# Patient Record
Sex: FEMALE | Race: WHITE | URBAN - METROPOLITAN AREA
[De-identification: names, ages, dates, MRNs, and addresses within clinical notes are randomized per-mention and may not be internally consistent; named-entity substitution may affect disease eponyms.]

---

## 2017-05-24 ENCOUNTER — OFFICE VISIT (OUTPATIENT)
Dept: FAMILY MEDICINE CLINIC | Facility: CLINIC | Age: 82
End: 2017-05-24

## 2017-05-24 VITALS
TEMPERATURE: 98 F | WEIGHT: 132.25 LBS | BODY MASS INDEX: 24.34 KG/M2 | DIASTOLIC BLOOD PRESSURE: 78 MMHG | RESPIRATION RATE: 16 BRPM | HEART RATE: 64 BPM | HEIGHT: 62 IN | SYSTOLIC BLOOD PRESSURE: 126 MMHG

## 2017-05-24 DIAGNOSIS — Z00.00 HEALTH CARE MAINTENANCE: Primary | ICD-10-CM

## 2017-05-24 DIAGNOSIS — M89.9 DISORDER OF BONE AND CARTILAGE: ICD-10-CM

## 2017-05-24 DIAGNOSIS — M94.9 DISORDER OF BONE AND CARTILAGE: ICD-10-CM

## 2017-05-24 DIAGNOSIS — Z00.00 ENCOUNTER FOR ANNUAL HEALTH EXAMINATION: ICD-10-CM

## 2017-05-24 DIAGNOSIS — K21.9 GASTROESOPHAGEAL REFLUX DISEASE WITHOUT ESOPHAGITIS: ICD-10-CM

## 2017-05-24 DIAGNOSIS — E78.49 FAMILIAL MULTIPLE LIPOPROTEIN-TYPE HYPERLIPIDEMIA: ICD-10-CM

## 2017-05-24 PROCEDURE — 99397 PER PM REEVAL EST PAT 65+ YR: CPT | Performed by: FAMILY MEDICINE

## 2017-05-24 PROCEDURE — G0439 PPPS, SUBSEQ VISIT: HCPCS | Performed by: FAMILY MEDICINE

## 2017-05-24 PROCEDURE — 96160 PT-FOCUSED HLTH RISK ASSMT: CPT | Performed by: FAMILY MEDICINE

## 2017-05-24 RX ORDER — MELATONIN
1 DAILY
COMMUNITY
Start: 2016-05-25 | End: 2021-09-14

## 2017-05-24 RX ORDER — CHLORAL HYDRATE 500 MG
1 CAPSULE ORAL DAILY
COMMUNITY
Start: 2004-11-01 | End: 2021-08-30

## 2017-05-24 RX ORDER — BIOTIN 1 MG
1 TABLET ORAL DAILY
COMMUNITY
Start: 2015-01-26

## 2017-05-24 RX ORDER — MECLIZINE HCL 12.5 MG/1
1 TABLET ORAL AS NEEDED
COMMUNITY
Start: 2016-01-08 | End: 2019-03-05

## 2017-05-24 RX ORDER — IBUPROFEN 200 MG
1 CAPSULE ORAL DAILY
COMMUNITY
Start: 2016-05-25

## 2017-05-24 RX ORDER — SPIRONOLACTONE 25 MG
1 TABLET ORAL
COMMUNITY
Start: 2015-01-26

## 2017-05-24 RX ORDER — VITAMIN E 268 MG
1 CAPSULE ORAL DAILY
COMMUNITY
Start: 1997-11-01

## 2017-05-24 RX ORDER — CETIRIZINE HYDROCHLORIDE 10 MG/1
1 TABLET ORAL DAILY
COMMUNITY
Start: 2015-01-26 | End: 2021-09-14

## 2017-05-24 RX ORDER — OMEPRAZOLE 20 MG/1
1 CAPSULE, DELAYED RELEASE ORAL DAILY
COMMUNITY
Start: 2015-01-26

## 2017-05-24 NOTE — PATIENT INSTRUCTIONS
Continue with same medications and healthy lifestyle. E Cathi Kc's SCREENING SCHEDULE   Tests on this list are recommended by your physician but may not be covered, or covered at this frequency, by your insurer.  Please check with your insurance carrier Sigmoidoscopy Screen  Covered every 5 years No results found for this or any previous visit. No flowsheet data found. Fecal Occult Blood   Covered Annually No results found for: FOB, OCCULTSTOOL No flowsheet data found.      Barium Enema-   uncomfortabl for Moderate/High Risk       No orders found for this or any previous visit.  Medium/high risk factors:   End-stage renal disease   Hemophiliacs who received Factor VIII or IX concentrates   Clients of institutions for the mentally retarded   Persons who li

## 2017-05-24 NOTE — PROGRESS NOTES
[address]  PROGRESS NOTE  Chief Complaint:   Patient presents with: Well Adult: Medicare physical      HPI:   This is a 80year old female coming in for general wellness and recheck of problems. Overall patient feels well.   She stays Oral Tab Take 1 tablet by mouth as needed. For  dizziness Disp:  Rfl:    omega-3 fatty acids 1000 MG Oral Cap Take 1 capsule by mouth daily. Disp:  Rfl:    vitamin E 400 UNITS Oral Cap Take 1 capsule by mouth daily.  Disp:  Rfl:    omeprazole (PRILOSEC) 20 nasal discharge Throat:  No tonsillar erythema or exudate. Mouth:  No oral lesions or ulcerations, good dentition. NECK: Supple, no CLAD, no JVD, no thyromegaly. SKIN: No rashes, no skin lesion, no bruising, good turgor.   HEART:  Regular rate and rhythm treatments as a result of today.      Problem List:  Patient Active Problem List:     Esophageal reflux     Familial multiple lipoprotein-type hyperlipidemia     Disorder of bone and cartilage     Dizziness and giddiness      Olivier Crowe MD  5/24/2017

## 2018-05-16 ENCOUNTER — OFFICE VISIT (OUTPATIENT)
Dept: FAMILY MEDICINE CLINIC | Facility: CLINIC | Age: 83
End: 2018-05-16

## 2018-05-16 VITALS
HEIGHT: 62 IN | DIASTOLIC BLOOD PRESSURE: 70 MMHG | SYSTOLIC BLOOD PRESSURE: 132 MMHG | HEART RATE: 84 BPM | BODY MASS INDEX: 24.44 KG/M2 | TEMPERATURE: 98 F | WEIGHT: 132.81 LBS | RESPIRATION RATE: 16 BRPM

## 2018-05-16 DIAGNOSIS — Z00.00 HEALTH CARE MAINTENANCE: Primary | ICD-10-CM

## 2018-05-16 DIAGNOSIS — Z00.00 ENCOUNTER FOR ANNUAL HEALTH EXAMINATION: ICD-10-CM

## 2018-05-16 DIAGNOSIS — R42 VERTIGO: ICD-10-CM

## 2018-05-16 DIAGNOSIS — E78.49 FAMILIAL MULTIPLE LIPOPROTEIN-TYPE HYPERLIPIDEMIA: ICD-10-CM

## 2018-05-16 DIAGNOSIS — M94.9 DISORDER OF BONE AND CARTILAGE: ICD-10-CM

## 2018-05-16 DIAGNOSIS — M89.9 DISORDER OF BONE AND CARTILAGE: ICD-10-CM

## 2018-05-16 DIAGNOSIS — K21.9 GASTROESOPHAGEAL REFLUX DISEASE WITHOUT ESOPHAGITIS: ICD-10-CM

## 2018-05-16 DIAGNOSIS — R73.9 HYPERGLYCEMIA: ICD-10-CM

## 2018-05-16 PROCEDURE — 99397 PER PM REEVAL EST PAT 65+ YR: CPT | Performed by: FAMILY MEDICINE

## 2018-05-16 PROCEDURE — G0439 PPPS, SUBSEQ VISIT: HCPCS | Performed by: FAMILY MEDICINE

## 2018-05-16 PROCEDURE — 96160 PT-FOCUSED HLTH RISK ASSMT: CPT | Performed by: FAMILY MEDICINE

## 2018-05-16 RX ORDER — ATORVASTATIN CALCIUM 10 MG/1
10 TABLET, FILM COATED ORAL NIGHTLY
Qty: 30 TABLET | Refills: 2 | Status: SHIPPED | OUTPATIENT
Start: 2018-05-16 | End: 2018-08-20

## 2018-05-16 NOTE — PROGRESS NOTES
[facility] [place]  PROGRESS NOTE  Chief Complaint:   Patient presents with:  Physical      HPI:   This is a [de-identified]year old female coming in for general wellness and recheck of problems of. Overall the patient feels fairly well.   She has a long hi Dash Esquivel Brother       in his 46s of heart problems     Allergies:  No Known Allergies  Current Meds:    Current Outpatient Prescriptions:  Biotin 1000 MCG Oral Tab Take 1 tablet by mouth daily.  Disp:  Rfl:    Calcium 600 MG Oral Tab Take 1 tablet by mo splenectomy. PSYCHIATRIC:  Denies depression or anxiety.       EXAM:   /70 (BP Location: Left arm, Patient Position: Sitting, Cuff Size: adult)   Pulse 84   Temp 98.4 °F (36.9 °C) (Temporal)   Resp 16   Ht 62\"   Wt 132 lb 12.8 oz   BMI 24.29 kg/m² start atorvastatin 10 mg daily  2. GERD: Recommend EGD by gastroenterologist in [facility]. 3.  Vertigo: Intermittent and chronic. Will continue using meclizine as needed. 4.  History of osteopenia: To stay active.       Health Maintenance:  DEXA Scan due

## 2018-06-08 ENCOUNTER — OFFICE VISIT (OUTPATIENT)
Dept: FAMILY MEDICINE CLINIC | Facility: CLINIC | Age: 83
End: 2018-06-08

## 2018-06-08 VITALS
HEIGHT: 62 IN | RESPIRATION RATE: 18 BRPM | TEMPERATURE: 98 F | SYSTOLIC BLOOD PRESSURE: 118 MMHG | DIASTOLIC BLOOD PRESSURE: 64 MMHG | BODY MASS INDEX: 24.36 KG/M2 | WEIGHT: 132.38 LBS | HEART RATE: 84 BPM

## 2018-06-08 DIAGNOSIS — L03.032 PARONYCHIA OF GREAT TOE, LEFT: Primary | ICD-10-CM

## 2018-06-08 PROCEDURE — 99214 OFFICE O/P EST MOD 30 MIN: CPT | Performed by: FAMILY MEDICINE

## 2018-06-08 RX ORDER — CEPHALEXIN 500 MG/1
500 CAPSULE ORAL 3 TIMES DAILY
Qty: 30 CAPSULE | Refills: 0 | Status: SHIPPED | OUTPATIENT
Start: 2018-06-08 | End: 2019-03-05

## 2018-06-08 NOTE — PATIENT INSTRUCTIONS
Start oral cephalexin for 10 days. Also discussed possible nail fungus infection. Also trial of over the counter lamisil. Return to clinic if no improvement or pain getting worse.

## 2018-06-08 NOTE — PROGRESS NOTES
[address]  PROGRESS NOTE  Chief Complaint:   Patient presents with:   Follow - Up: toe pain has not improved      HPI:   This is a [de-identified]year old female presents complaining of forearm left great toe pain that has been present for past coup Rfl:    cetirizine (ZYRTEC ALLERGY) 10 MG Oral Tab Take 1 tablet by mouth daily. Disp:  Rfl:    Vitamin D3 1000 units Oral Tab Take 1 tablet by mouth daily. Disp:  Rfl:    Lutein ([employer] LUTEIN) 6 MG Oral Cap Take 1 capsule by mouth daily.  Disp:  Rfl:    Mec Regular rate and rhythm, no murmurs, rubs or gallops. EXTREMITIES:  No edema, no cyanosis, no clubbing, FROM, 2+ dorsalis pedis pulses bilaterally.   ABDOMEN:  Soft, nondistended, nontender, bowel sounds normal in all 4 quadrants, no masses, no hepatosplen

## 2018-07-06 RX ORDER — CEPHALEXIN 500 MG/1
CAPSULE ORAL
Qty: 30 CAPSULE | Refills: 0 | OUTPATIENT
Start: 2018-07-06

## 2018-07-06 NOTE — TELEPHONE ENCOUNTER
Patient states Her toe is still a little red. Feels if she had one more round of antibiotic Her toe might get better. Advised appt since it has been a month since last seen. Patient declined.   States She has been seen by Dr Veda Angelucci and Dr Chepe Mccarthy  And

## 2018-07-18 RX ORDER — CEPHALEXIN 500 MG/1
500 CAPSULE ORAL 3 TIMES DAILY
Qty: 30 CAPSULE | Refills: 0 | OUTPATIENT
Start: 2018-07-18

## 2018-07-18 NOTE — TELEPHONE ENCOUNTER
Future appt:    Last Appointment:  5/16/2018  No results found for: CHOLEST, HDL, LDL, TRIGLY, TRIG  No results found for: EAG, A1C  No results found for: T4F, TSH, TSHT4    No Follow-up on file.

## 2018-08-13 ENCOUNTER — TELEPHONE (OUTPATIENT)
Dept: FAMILY MEDICINE CLINIC | Facility: CLINIC | Age: 83
End: 2018-08-13

## 2018-08-13 NOTE — TELEPHONE ENCOUNTER
Pt is due for blood work and would like to have them done at [facility]. Faxed orders to [facility]. Pt will schedule appt for blood work.

## 2018-08-20 RX ORDER — ATORVASTATIN CALCIUM 10 MG/1
10 TABLET, FILM COATED ORAL NIGHTLY
Qty: 90 TABLET | Refills: 1 | Status: SHIPPED | OUTPATIENT
Start: 2018-08-20 | End: 2019-02-12

## 2018-08-20 NOTE — TELEPHONE ENCOUNTER
Informed pt of her blood work results. Pt expressed understanding and thanks. Pt needs refill for Lipitor 90 day supply.

## 2018-08-20 NOTE — TELEPHONE ENCOUNTER
Labs done at [facility] are excellent. CBC, thyroid, chemistry profile and cholesterol are normal.  Patient was started on atorvastatin in May 2018.   Her cholesterol is improved to 140 with an LDL of 59 and an HDL of 67–these are excellent readin

## 2018-09-27 ENCOUNTER — TELEPHONE (OUTPATIENT)
Dept: FAMILY MEDICINE CLINIC | Facility: CLINIC | Age: 83
End: 2018-09-27

## 2018-09-27 NOTE — TELEPHONE ENCOUNTER
Patient is requesting lab results mailed to her home for blood test done at [facility] ordered by Dr Bassam Corral from August 18th

## 2018-12-12 ENCOUNTER — TELEPHONE (OUTPATIENT)
Dept: FAMILY MEDICINE CLINIC | Facility: CLINIC | Age: 83
End: 2018-12-12

## 2018-12-12 NOTE — TELEPHONE ENCOUNTER
Pt just wanted to know if there was a new shingle shot. I informed pt yes there is and its shingrix. Pt will go to pharmacy to get.

## 2018-12-14 ENCOUNTER — TELEPHONE (OUTPATIENT)
Dept: FAMILY MEDICINE CLINIC | Facility: CLINIC | Age: 83
End: 2018-12-14

## 2018-12-14 NOTE — TELEPHONE ENCOUNTER
Requesting records for patient, recent lab or imaging faxed to [phone] attention to [facility]

## 2019-02-12 RX ORDER — ATORVASTATIN CALCIUM 10 MG/1
10 TABLET, FILM COATED ORAL NIGHTLY
Qty: 90 TABLET | Refills: 0 | Status: SHIPPED | OUTPATIENT
Start: 2019-02-12 | End: 2019-05-09

## 2019-02-12 NOTE — TELEPHONE ENCOUNTER
Future appt:    Last Appointment:  5/16/2018 Dr Benjamin Baxter  No results found for: CHOLEST, HDL, LDL, TRIGLY, TRIG  No results found for: EAG, A1C  No results found for: T4F, TSH, TSHT4    No Follow-up on file.

## 2019-02-12 NOTE — TELEPHONE ENCOUNTER
Patient informed need for Office Visit for continuation of Medication. Appt given. Requesting 90 day Rx to [facility]. Future appt:     Your appointments     Date & Time Appointment Department [facility])    Mar 05, 2019 10:00 AM BONNY W[facility] Supervisit with Gama Michel

## 2019-03-05 ENCOUNTER — APPOINTMENT (OUTPATIENT)
Dept: LAB | Age: 84
End: 2019-03-05
Attending: NURSE PRACTITIONER
Payer: MEDICARE

## 2019-03-05 ENCOUNTER — OFFICE VISIT (OUTPATIENT)
Dept: FAMILY MEDICINE CLINIC | Facility: CLINIC | Age: 84
End: 2019-03-05
Payer: COMMERCIAL

## 2019-03-05 VITALS
HEART RATE: 80 BPM | HEIGHT: 62 IN | SYSTOLIC BLOOD PRESSURE: 130 MMHG | WEIGHT: 132.19 LBS | RESPIRATION RATE: 16 BRPM | OXYGEN SATURATION: 98 % | TEMPERATURE: 98 F | BODY MASS INDEX: 24.33 KG/M2 | DIASTOLIC BLOOD PRESSURE: 80 MMHG

## 2019-03-05 DIAGNOSIS — M89.9 DISORDER OF BONE AND CARTILAGE: ICD-10-CM

## 2019-03-05 DIAGNOSIS — E78.49 FAMILIAL MULTIPLE LIPOPROTEIN-TYPE HYPERLIPIDEMIA: ICD-10-CM

## 2019-03-05 DIAGNOSIS — R42 VERTIGO: Primary | ICD-10-CM

## 2019-03-05 DIAGNOSIS — M94.9 DISORDER OF BONE AND CARTILAGE: ICD-10-CM

## 2019-03-05 DIAGNOSIS — Z51.81 MEDICATION MONITORING ENCOUNTER: ICD-10-CM

## 2019-03-05 DIAGNOSIS — Z00.00 ENCOUNTER FOR ANNUAL HEALTH EXAMINATION: ICD-10-CM

## 2019-03-05 LAB
ALBUMIN SERPL-MCNC: 4.1 G/DL (ref 3.4–5)
ALBUMIN/GLOB SERPL: 1.2 {RATIO} (ref 1–2)
ALP LIVER SERPL-CCNC: 101 U/L (ref 55–142)
ALT SERPL-CCNC: 31 U/L (ref 13–56)
ANION GAP SERPL CALC-SCNC: 7 MMOL/L (ref 0–18)
AST SERPL-CCNC: 25 U/L (ref 15–37)
BILIRUB SERPL-MCNC: 0.8 MG/DL (ref 0.1–2)
BUN BLD-MCNC: 16 MG/DL (ref 7–18)
BUN/CREAT SERPL: 20 (ref 10–20)
CALCIUM BLD-MCNC: 10.1 MG/DL (ref 8.5–10.1)
CHLORIDE SERPL-SCNC: 107 MMOL/L (ref 98–107)
CO2 SERPL-SCNC: 26 MMOL/L (ref 21–32)
CREAT BLD-MCNC: 0.8 MG/DL (ref 0.55–1.02)
GLOBULIN PLAS-MCNC: 3.5 G/DL (ref 2.8–4.4)
GLUCOSE BLD-MCNC: 104 MG/DL (ref 70–99)
M PROTEIN MFR SERPL ELPH: 7.6 G/DL (ref 6.4–8.2)
OSMOLALITY SERPL CALC.SUM OF ELEC: 291 MOSM/KG (ref 275–295)
POTASSIUM SERPL-SCNC: 4.1 MMOL/L (ref 3.5–5.1)
SODIUM SERPL-SCNC: 140 MMOL/L (ref 136–145)

## 2019-03-05 PROCEDURE — 90471 IMMUNIZATION ADMIN: CPT | Performed by: NURSE PRACTITIONER

## 2019-03-05 PROCEDURE — 93000 ELECTROCARDIOGRAM COMPLETE: CPT | Performed by: NURSE PRACTITIONER

## 2019-03-05 PROCEDURE — 90715 TDAP VACCINE 7 YRS/> IM: CPT | Performed by: NURSE PRACTITIONER

## 2019-03-05 PROCEDURE — 96160 PT-FOCUSED HLTH RISK ASSMT: CPT | Performed by: NURSE PRACTITIONER

## 2019-03-05 PROCEDURE — G0439 PPPS, SUBSEQ VISIT: HCPCS | Performed by: NURSE PRACTITIONER

## 2019-03-05 PROCEDURE — 80053 COMPREHEN METABOLIC PANEL: CPT | Performed by: NURSE PRACTITIONER

## 2019-03-05 PROCEDURE — 99397 PER PM REEVAL EST PAT 65+ YR: CPT | Performed by: NURSE PRACTITIONER

## 2019-03-05 PROCEDURE — 36415 COLL VENOUS BLD VENIPUNCTURE: CPT | Performed by: NURSE PRACTITIONER

## 2019-03-05 RX ORDER — MULTIVITAMIN WITH IRON
250 TABLET ORAL DAILY
COMMUNITY
End: 2021-09-14

## 2019-03-05 RX ORDER — MECLIZINE HCL 12.5 MG/1
12.5 TABLET ORAL AS NEEDED
Qty: 90 TABLET | Refills: 0 | Status: SHIPPED | OUTPATIENT
Start: 2019-03-05 | End: 2021-09-14

## 2019-03-05 NOTE — PROGRESS NOTES
HPI:   BORA Cathi Easton is a 82year old female who presents for a MA (Medicare Advantage) Supervisit (Once per calendar year). Patient is present for her annual wellness exam.  States in general that she is doing well.   She is current on her mammogram as Patient Care Team:  Latia Hurst MD as PCP - General    Patient Active Problem List:     Esophageal reflux     Familial multiple lipoprotein-type hyperlipidemia     Disorder of bone and cartilage     Vertigo    Wt Readings from Last 3 Encounters:  03/05/ (2006); tubal ligation; and cataract. Her family history includes CAD in her brother, father, and mother; Cervical cancer in her sister; Lung cancer in her brother; Ovarian Cancer in her sister. SOCIAL HISTORY:   She  reports that  has never smoked.  s palpation. Breasts:  normal appearance, no masses or tenderness, Inspection negative, No nipple retraction or dimpling, No axillary or supraclavicular adenopathy   Extremities: Good range of motion. No edema. Skin: Intact, dry.   Several moles noted viki mental well-being?: Social Interaction;Games; Visiting Family;Puzzles; Visiting Friends      This section provided for quick review of chart, separate sheet to patient  [address] Internal Lab or Procedure External Lab or (Pneumovax)  Covered Once after 65 01/01/2004 Please get once after your 65th birthday    Hepatitis B for Moderate/High Risk No vaccine history found Medium/high risk factors:   End-stage renal disease   Hemophiliacs who received Factor VIII or IX concentr

## 2019-03-05 NOTE — PATIENT INSTRUCTIONS
Refill done for meclizine. EKG: sinus rhythm with occasional PAC, one PVC, HR 77  CMP pending. Continue with healthy lifestyle. Follow-up in August for appointment and labs. Sooner if needed.    E Cathi Kc's SCREENING SCHEDULE   Tests on this list Colonoscopy Screen   Covered every 10 years- more often if abnormal Colonoscopy due on 03/26/2020 Update [facility] if applicable    Flex Sigmoidoscopy Screen  Covered every 5 years No results found for this or any previous visit.  No flowsheet data after your 65th birthday    Pneumococcal 23 (Pneumovax)  Covered Once after 65 No orders found for this or any previous visit.  Please get once after your 65th birthday    Hepatitis B for Moderate/High Risk       No orders found for this or any previous vis

## 2019-03-06 ENCOUNTER — TELEPHONE (OUTPATIENT)
Dept: FAMILY MEDICINE CLINIC | Facility: CLINIC | Age: 84
End: 2019-03-06

## 2019-03-06 NOTE — TELEPHONE ENCOUNTER
----- Message from MONA Vaughn sent at 3/6/2019  8:21 AM CST -----  Blood sugar is slightly elevated, but okay since was nonfasting. Please let patient know that her liver enzymes are normal.  Thank you.

## 2019-03-07 ENCOUNTER — HOSPITAL ENCOUNTER (OUTPATIENT)
Dept: BONE DENSITY | Age: 84
Discharge: HOME | End: 2019-03-07
Attending: NURSE PRACTITIONER
Payer: MEDICARE

## 2019-03-07 DIAGNOSIS — M89.9 DISORDER OF BONE AND CARTILAGE: ICD-10-CM

## 2019-03-07 DIAGNOSIS — Z00.00 ENCOUNTER FOR ANNUAL HEALTH EXAMINATION: ICD-10-CM

## 2019-03-07 DIAGNOSIS — M94.9 DISORDER OF BONE AND CARTILAGE: ICD-10-CM

## 2019-03-07 PROCEDURE — 77080 DXA BONE DENSITY AXIAL: CPT | Performed by: NURSE PRACTITIONER

## 2019-03-08 ENCOUNTER — TELEPHONE (OUTPATIENT)
Dept: FAMILY MEDICINE CLINIC | Facility: CLINIC | Age: 84
End: 2019-03-08

## 2019-03-08 NOTE — TELEPHONE ENCOUNTER
Informed pt of her dexa scan results. Pt refused Prolia, sister in law had a back reaction to this med. Pt will continue to take vit D and calcium. Pt expressed understanding and thanks.

## 2019-05-09 RX ORDER — ATORVASTATIN CALCIUM 10 MG/1
TABLET, FILM COATED ORAL
Qty: 90 TABLET | Refills: 0 | Status: SHIPPED | OUTPATIENT
Start: 2019-05-09 | End: 2019-08-08

## 2019-08-08 DIAGNOSIS — E78.49 FAMILIAL MULTIPLE LIPOPROTEIN-TYPE HYPERLIPIDEMIA: Primary | ICD-10-CM

## 2019-08-08 RX ORDER — ATORVASTATIN CALCIUM 10 MG/1
TABLET, FILM COATED ORAL
Qty: 90 TABLET | Refills: 0 | Status: SHIPPED | OUTPATIENT
Start: 2019-08-08 | End: 2019-10-31

## 2019-10-31 ENCOUNTER — HOSPITAL ENCOUNTER (OUTPATIENT)
Dept: GENERAL RADIOLOGY | Age: 84
Discharge: HOME | End: 2019-10-31
Attending: FAMILY MEDICINE
Payer: MEDICARE

## 2019-10-31 ENCOUNTER — OFFICE VISIT (OUTPATIENT)
Dept: FAMILY MEDICINE CLINIC | Facility: CLINIC | Age: 84
End: 2019-10-31
Payer: COMMERCIAL

## 2019-10-31 VITALS
TEMPERATURE: 98 F | BODY MASS INDEX: 23 KG/M2 | HEIGHT: 62 IN | OXYGEN SATURATION: 95 % | RESPIRATION RATE: 18 BRPM | SYSTOLIC BLOOD PRESSURE: 120 MMHG | WEIGHT: 125 LBS | DIASTOLIC BLOOD PRESSURE: 80 MMHG | HEART RATE: 92 BPM

## 2019-10-31 DIAGNOSIS — Z01.818 PREOPERATIVE EXAMINATION: ICD-10-CM

## 2019-10-31 DIAGNOSIS — K80.20 SYMPTOMATIC CHOLELITHIASIS: ICD-10-CM

## 2019-10-31 DIAGNOSIS — K83.8 COMMON BILE DUCT DILATATION: ICD-10-CM

## 2019-10-31 DIAGNOSIS — E78.49 FAMILIAL MULTIPLE LIPOPROTEIN-TYPE HYPERLIPIDEMIA: ICD-10-CM

## 2019-10-31 DIAGNOSIS — Z01.818 PREOPERATIVE EXAMINATION: Primary | ICD-10-CM

## 2019-10-31 PROCEDURE — 93000 ELECTROCARDIOGRAM COMPLETE: CPT | Performed by: FAMILY MEDICINE

## 2019-10-31 PROCEDURE — 99214 OFFICE O/P EST MOD 30 MIN: CPT | Performed by: FAMILY MEDICINE

## 2019-10-31 PROCEDURE — 71046 X-RAY EXAM CHEST 2 VIEWS: CPT | Performed by: FAMILY MEDICINE

## 2019-10-31 RX ORDER — ATORVASTATIN CALCIUM 10 MG/1
TABLET, FILM COATED ORAL
Qty: 90 TABLET | Refills: 1 | Status: SHIPPED | OUTPATIENT
Start: 2019-10-31 | End: 2020-04-17

## 2019-10-31 RX ORDER — BETAMETHASONE DIPROPIONATE 0.5 MG/G
LOTION TOPICAL
Refills: 1 | COMMUNITY
Start: 2019-10-07 | End: 2021-09-14

## 2019-10-31 NOTE — PROGRESS NOTES
[facility]  [facility]    Chief Complaint:   Patient presents with:  Pre-Op Exam      HPI:   Alvaro Raines is a 82year old female with a hx of symptomatic gallstones, common bile duct dilation, who presents for a pre-operative physical mg by mouth daily. , Disp: , Rfl:   Cranberry 300 MG Oral Tab, Take 300 mg by mouth daily. , Disp: , Rfl:   Meclizine HCl 12.5 MG Oral Tab, Take 1 tablet (12.5 mg total) by mouth as needed.  For  dizziness, Disp: 90 tablet, Rfl: 0  Biotin 1000 MCG Oral Tab, T enlarged nodes or history of splenectomy. PSYCHIATRIC:  Denies depression or anxiety. ENDOCRINOLOGIC:  Denies excessive sweating, cold or heat intolerance, polyuria or polydipsia.   ALLERGIES:  Denies allergic response, history of asthma, sneezing, hives, depressed mood or anxiety      ASSESSMENT AND PLAN:   E June was seen today for pre-op exam.    Diagnoses and all orders for this visit:    Preoperative examination  -     ELECTROCARDIOGRAM, COMPLETE  -     CBC WITH DIFFERENTIAL WITH PLATELET  -     COMP M

## 2019-10-31 NOTE — PATIENT INSTRUCTIONS
EKG shows sinus rhythm. Chest xray and labs today. After today's assessment  patient is at optimum health for surgery and relatively at low risk. There are no contraindication for procedure.    Recommend to avoid aleve, aspirin, ibuprofen and multivitam

## 2019-11-01 ENCOUNTER — TELEPHONE (OUTPATIENT)
Dept: FAMILY MEDICINE CLINIC | Facility: CLINIC | Age: 84
End: 2019-11-01

## 2019-11-01 NOTE — TELEPHONE ENCOUNTER
----- Message from Ivy Aiken MD sent at 11/1/2019  8:28 AM CDT -----  Please inform patient that her CBC is okay, her CMP is okay except slightly elevated calcium level at 10.6. Recommend to cut back on calcium supplement if she is taking any.   Her ch

## 2019-11-02 NOTE — TELEPHONE ENCOUNTER
Let pt know the following below. Pt verbalized her understanding and had no other questions at this time. Faxed to surgeons office.

## 2019-11-02 NOTE — TELEPHONE ENCOUNTER
----- Message from Neil Oneal sent at 11/2/2019  9:56 AM CDT -----  Regarding: r/c  Can be reached back at [phone].  Thank you

## 2019-11-21 ENCOUNTER — TELEPHONE (OUTPATIENT)
Dept: FAMILY MEDICINE CLINIC | Facility: CLINIC | Age: 84
End: 2019-11-21

## 2019-11-21 DIAGNOSIS — Z90.49 S/P CHOLECYSTECTOMY: Primary | ICD-10-CM

## 2020-01-16 ENCOUNTER — PATIENT OUTREACH (OUTPATIENT)
Dept: FAMILY MEDICINE CLINIC | Facility: CLINIC | Age: 85
End: 2020-01-16

## 2020-04-17 DIAGNOSIS — E78.49 FAMILIAL MULTIPLE LIPOPROTEIN-TYPE HYPERLIPIDEMIA: ICD-10-CM

## 2020-04-17 RX ORDER — ATORVASTATIN CALCIUM 10 MG/1
TABLET, FILM COATED ORAL
Qty: 90 TABLET | Refills: 0 | Status: SHIPPED | OUTPATIENT
Start: 2020-04-17 | End: 2020-08-19

## 2020-04-17 NOTE — TELEPHONE ENCOUNTER
Future appt:    Last Appointment with provider:   10/31/2019  Last appointment at [facility]:  10/31/2019  No results found for: CHOLEST, HDL, LDL, TRIGLY, TRIG  No results found for: EAG, A1C  No results found for: T4F, TSH, TSHT4    No follow-ups on fi

## 2020-08-12 DIAGNOSIS — E78.49 FAMILIAL MULTIPLE LIPOPROTEIN-TYPE HYPERLIPIDEMIA: ICD-10-CM

## 2020-08-12 RX ORDER — ATORVASTATIN CALCIUM 10 MG/1
TABLET, FILM COATED ORAL
Qty: 90 TABLET | Refills: 0 | OUTPATIENT
Start: 2020-08-12

## 2020-08-12 NOTE — TELEPHONE ENCOUNTER
Future appt:    Last Appointment with provider:   Visit date not found  Last appointment at [facility]:  Visit date not found  No results found for: CHOLEST, HDL, LDL, TRIGLY, TRIG  No results found for: EAG, A1C  No results found for: T4F, TSH, TSHT4

## 2020-08-18 DIAGNOSIS — E78.49 FAMILIAL MULTIPLE LIPOPROTEIN-TYPE HYPERLIPIDEMIA: ICD-10-CM

## 2020-08-18 NOTE — TELEPHONE ENCOUNTER
Future appt:     Your appointments     Date & Time Appointment Department [facility])    Aug 26, 2020  2:00 PM CDT Virtual Phone Visit with Bayron Bradford MD [address] [facility] [place] ([facility])    You have been schedule

## 2020-08-18 NOTE — TELEPHONE ENCOUNTER
Future appt:    Last Appointment with provider:   Visit date not found  Last appointment at [facility]:  Visit date not found  No results found for: CHOLEST, HDL, LDL, TRIGLY, TRIG  No results found for: EAG, A1C  No results found for: T4F, TSH, TSHT4

## 2020-08-19 RX ORDER — ATORVASTATIN CALCIUM 10 MG/1
TABLET, FILM COATED ORAL
Qty: 90 TABLET | Refills: 0 | Status: SHIPPED | OUTPATIENT
Start: 2020-08-19 | End: 2020-11-13

## 2020-08-19 NOTE — TELEPHONE ENCOUNTER
See refill request from 8/12/2020.     Future Appointments   Date Time Provider Celio Bishop   8/26/2020  2:00 PM Nusrat Jean MD [facility]     Talked to nurse 8/18/2020 regarding this refill and was told the prescription would be mechelle

## 2020-08-19 NOTE — TELEPHONE ENCOUNTER
Future Appointments   Date Time Provider Celio Edna   8/26/2020  2:00 PM Grace Jean MD [facility] [place] [facility]     Talked to nurse 8/18/2020 regarding this refill and was told the prescription would be called in right away.    Patient only as a

## 2020-08-26 ENCOUNTER — VIRTUAL PHONE E/M (OUTPATIENT)
Dept: FAMILY MEDICINE CLINIC | Facility: CLINIC | Age: 85
End: 2020-08-26
Payer: COMMERCIAL

## 2020-08-26 VITALS — BODY MASS INDEX: 23 KG/M2 | WEIGHT: 125 LBS | HEIGHT: 62 IN

## 2020-08-26 DIAGNOSIS — K21.9 GASTROESOPHAGEAL REFLUX DISEASE WITHOUT ESOPHAGITIS: ICD-10-CM

## 2020-08-26 DIAGNOSIS — E78.49 FAMILIAL MULTIPLE LIPOPROTEIN-TYPE HYPERLIPIDEMIA: Primary | ICD-10-CM

## 2020-08-26 PROCEDURE — 99441 PHONE E/M BY PHYS 5-10 MIN: CPT | Performed by: FAMILY MEDICINE

## 2020-08-26 PROCEDURE — 3008F BODY MASS INDEX DOCD: CPT | Performed by: FAMILY MEDICINE

## 2020-08-26 NOTE — PROGRESS NOTES
Alvaro Yanna  verbally consents to a Virtual/Telephone Check-In service on 8/26/2020. Patient understands and accepts financial responsibility for any deductible, co-insurance and/or co-pays associated with this service.     Duration of the service: 8 m distress  HEART:  No heart racing or skipped beat. ABDOMEN: Soft,  no pain with palpation per patient. SKIN: No rashes, no skin lesion, no bruising   MUSCULOSKELETAL: Normal ROM, no joint pain, or muscle weakness in all extremity.    NEUROLOGICAL:  No w

## 2020-08-26 NOTE — PATIENT INSTRUCTIONS
Continue with low-cholesterol diet and atorvastatin. Acid reflux stable with medication. Return to clinic if any concern.

## 2020-11-11 DIAGNOSIS — E78.49 FAMILIAL MULTIPLE LIPOPROTEIN-TYPE HYPERLIPIDEMIA: ICD-10-CM

## 2020-11-13 RX ORDER — ATORVASTATIN CALCIUM 10 MG/1
TABLET, FILM COATED ORAL
Qty: 90 TABLET | Refills: 0 | Status: SHIPPED | OUTPATIENT
Start: 2020-11-13 | End: 2021-02-08

## 2020-11-13 NOTE — TELEPHONE ENCOUNTER
Future appt:     Last Appointment with provider:  8/26/2020 for a virtual visit with Dr. Gregoria Velarde; Return in about 6 months (around 2/26/2021) for medicare physical.    Last appointment at [facility]:  Visit date not found  No results found for: Kesha Manuel

## 2020-11-17 ENCOUNTER — TELEPHONE (OUTPATIENT)
Dept: FAMILY MEDICINE CLINIC | Facility: CLINIC | Age: 85
End: 2020-11-17

## 2020-11-17 NOTE — TELEPHONE ENCOUNTER
Pt was questioning why she only receives 90 days supply at a time. Pt informed that this is typically how Dr. Myra Casiano handles his refills.

## 2020-11-17 NOTE — TELEPHONE ENCOUNTER
I spoke with patient to get her scheduled for her Medicare Wellness Exam - Patient refused at this time.

## 2021-02-03 ENCOUNTER — PATIENT OUTREACH (OUTPATIENT)
Dept: FAMILY MEDICINE CLINIC | Facility: CLINIC | Age: 86
End: 2021-02-03

## 2021-02-08 ENCOUNTER — TELEPHONE (OUTPATIENT)
Dept: FAMILY MEDICINE CLINIC | Facility: CLINIC | Age: 86
End: 2021-02-08

## 2021-02-08 DIAGNOSIS — E78.49 FAMILIAL MULTIPLE LIPOPROTEIN-TYPE HYPERLIPIDEMIA: ICD-10-CM

## 2021-02-08 RX ORDER — ATORVASTATIN CALCIUM 10 MG/1
10 TABLET, FILM COATED ORAL NIGHTLY
Qty: 90 TABLET | Refills: 0 | Status: SHIPPED | OUTPATIENT
Start: 2021-02-08

## 2021-02-08 NOTE — TELEPHONE ENCOUNTER
Pt found out that she is getting her covid vaccine this Wed. Pt asked to schedule for her annual exam end of March. Appt schedule.       Future Appointments   Date Time Provider Celio Bishop   3/31/2021  8:00 AM Chapo Bond MD [facility] [place] [facility]

## 2021-02-08 NOTE — TELEPHONE ENCOUNTER
Future appt:    Last Appointment with provider:   Visit date not found  Last appointment at [facility] [place]:  Visit date not found  Last virtual appt: 8/26/20- pt was advised to return in 6 months    Pt contacted-  Pt has been in isolation.   Pt is not going o

## 2021-04-07 ENCOUNTER — PATIENT OUTREACH (OUTPATIENT)
Dept: FAMILY MEDICINE CLINIC | Facility: CLINIC | Age: 86
End: 2021-04-07

## 2021-05-05 ENCOUNTER — PATIENT OUTREACH (OUTPATIENT)
Dept: FAMILY MEDICINE CLINIC | Facility: CLINIC | Age: 86
End: 2021-05-05

## 2021-06-11 ENCOUNTER — PATIENT OUTREACH (OUTPATIENT)
Dept: FAMILY MEDICINE CLINIC | Facility: CLINIC | Age: 86
End: 2021-06-11

## 2021-08-03 ENCOUNTER — TELEPHONE (OUTPATIENT)
Dept: FAMILY MEDICINE CLINIC | Facility: CLINIC | Age: 86
End: 2021-08-03

## 2021-08-03 NOTE — TELEPHONE ENCOUNTER
Incubation period is 14 days so for the next 2 weeks they should monitor for any symptoms of runny nose, congestion, fever, body aches, headache, nausea vomiting diarrhea, etc.–if symptoms occur recommend office visit and sick clinic for Covid testing.

## 2021-08-03 NOTE — TELEPHONE ENCOUNTER
Pt is calling today because she was with her Nephew who woke up today not feeling well- he went for a covid test this morning and it was pos. Nephew we in pts home yesterday having dinner and Sunday they were together.     Pt is fully vaccinated and want

## 2021-08-30 ENCOUNTER — TELEPHONE (OUTPATIENT)
Dept: FAMILY MEDICINE CLINIC | Facility: CLINIC | Age: 86
End: 2021-08-30

## 2021-08-30 ENCOUNTER — OFFICE VISIT (OUTPATIENT)
Dept: FAMILY MEDICINE CLINIC | Facility: CLINIC | Age: 86
End: 2021-08-30
Payer: COMMERCIAL

## 2021-08-30 VITALS
BODY MASS INDEX: 22.45 KG/M2 | HEIGHT: 62 IN | RESPIRATION RATE: 17 BRPM | SYSTOLIC BLOOD PRESSURE: 120 MMHG | HEART RATE: 82 BPM | DIASTOLIC BLOOD PRESSURE: 68 MMHG | TEMPERATURE: 97 F | OXYGEN SATURATION: 96 % | WEIGHT: 122 LBS

## 2021-08-30 DIAGNOSIS — R11.0 NAUSEA: ICD-10-CM

## 2021-08-30 DIAGNOSIS — R10.84 GENERALIZED ABDOMINAL PAIN: Primary | ICD-10-CM

## 2021-08-30 DIAGNOSIS — R53.1 WEAKNESS: ICD-10-CM

## 2021-08-30 PROBLEM — I49.9 CARDIAC ARRHYTHMIA: Status: ACTIVE | Noted: 2019-11-06

## 2021-08-30 PROCEDURE — 99213 OFFICE O/P EST LOW 20 MIN: CPT | Performed by: NURSE PRACTITIONER

## 2021-08-30 PROCEDURE — 3078F DIAST BP <80 MM HG: CPT | Performed by: NURSE PRACTITIONER

## 2021-08-30 PROCEDURE — 3008F BODY MASS INDEX DOCD: CPT | Performed by: NURSE PRACTITIONER

## 2021-08-30 PROCEDURE — 3074F SYST BP LT 130 MM HG: CPT | Performed by: NURSE PRACTITIONER

## 2021-08-30 RX ORDER — ONDANSETRON 4 MG/1
4 TABLET, ORALLY DISINTEGRATING ORAL EVERY 8 HOURS PRN
Qty: 15 TABLET | Refills: 0 | Status: SHIPPED | OUTPATIENT
Start: 2021-08-30 | End: 2021-09-14

## 2021-08-30 NOTE — TELEPHONE ENCOUNTER
Patient's  Deepak Anderson states patient has been dealing with a diverticulitis flareup for the past 2-3 days. States patient is complaining of lower abd pain and weakness.   States patient is not really wanting to eat much but did have some soup broth yest

## 2021-08-30 NOTE — PATIENT INSTRUCTIONS
Blood work today   ER for worsening symptom   Lots of water   [employer]. Tylenol with food as needed.

## 2021-08-30 NOTE — TELEPHONE ENCOUNTER
patient is very weak, has discomfort, diverticulitis is the dx. Please give them a call back  No future appointments.

## 2021-08-30 NOTE — PROGRESS NOTES
HPI/Subjective:   Patient ID: Kelvin Delarosa is a 82year old female. Patient presents to clinic today for evaluation of abdominal pain. She is accompanied by her daughter.   Reports symptoms began approximately this past Friday night, sharp pain in enti nausea. Negative for blood in stool, constipation, diarrhea and vomiting. Genitourinary: Negative. Skin: Negative.       Current Outpatient Medications   Medication Sig Dispense Refill   • Omega-3 Fatty Acids (OMEGA-3 FISH OIL) 1200 MG Oral Cap Take by Appearance: She is well-developed. Cardiovascular:      Rate and Rhythm: Normal rate and regular rhythm. Heart sounds: Normal heart sounds. Pulmonary:      Effort: Pulmonary effort is normal.      Breath sounds: Normal breath sounds.    Abdominal: needed for Nausea.        Imaging & Referrals:  None

## 2021-08-30 NOTE — TELEPHONE ENCOUNTER
Spoke with patients son lon. She is having worsening pain behind her sternum. Unable to eat. Very uncomfortable. Informed him to take her to the ER. He verbalized understanding. Asked to call the [facility] ER to give them a heads up they are coming.

## 2021-08-30 NOTE — TELEPHONE ENCOUNTER
Pt seen this morning but abdominal pain is worse. Would like to discuss with the nurse. Forgot to mention pt has a hiatal hernia.

## 2021-08-30 NOTE — TELEPHONE ENCOUNTER
Note reviewed. Called [facility] ER to give report.   RN verbalized understanding, will be awaiting her arrival.

## 2021-08-31 LAB
ABSOLUTE BASOPHILS: 37 CELLS/UL (ref 0–200)
ABSOLUTE EOSINOPHILS: 19 CELLS/UL (ref 15–500)
ABSOLUTE LYMPHOCYTES: 670 CELLS/UL (ref 850–3900)
ABSOLUTE MONOCYTES: 930 CELLS/UL (ref 200–950)
ABSOLUTE NEUTROPHILS: ABNORMAL CELLS/UL (ref 1500–7800)
ALBUMIN/GLOBULIN RATIO: 1.4 (CALC) (ref 1–2.5)
ALBUMIN: 3.7 G/DL (ref 3.6–5.1)
ALKALINE PHOSPHATASE: 124 U/L (ref 37–153)
ALT: 244 U/L (ref 6–29)
AST: 143 U/L (ref 10–35)
BASOPHILS: 0.2 %
BILIRUBIN, TOTAL: 2.3 MG/DL (ref 0.2–1.2)
BUN/CREATININE RATIO: 23 (CALC) (ref 6–22)
BUN: 21 MG/DL (ref 7–25)
CALCIUM: 10.5 MG/DL (ref 8.6–10.4)
CARBON DIOXIDE: 25 MMOL/L (ref 20–32)
CHLORIDE: 96 MMOL/L (ref 98–110)
CREATININE: 0.93 MG/DL (ref 0.6–0.88)
EGFR IF AFRICN AM: 65 ML/MIN/1.73M2
EGFR IF NONAFRICN AM: 56 ML/MIN/1.73M2
EOSINOPHILS: 0.1 %
GLOBULIN: 2.6 G/DL (CALC) (ref 1.9–3.7)
GLUCOSE: 122 MG/DL (ref 65–139)
HEMATOCRIT: 42.7 % (ref 35–45)
HEMOGLOBIN: 14.1 G/DL (ref 11.7–15.5)
LYMPHOCYTES: 3.6 %
MCH: 30 PG (ref 27–33)
MCHC: 33 G/DL (ref 32–36)
MCV: 90.9 FL (ref 80–100)
MONOCYTES: 5 %
MPV: 10.5 FL (ref 7.5–12.5)
NEUTROPHILS: 91.1 %
PLATELET COUNT: 227 THOUSAND/UL (ref 140–400)
POTASSIUM: 4.2 MMOL/L (ref 3.5–5.3)
PROTEIN, TOTAL: 6.3 G/DL (ref 6.1–8.1)
RDW: 12.9 % (ref 11–15)
RED BLOOD CELL COUNT: 4.7 MILLION/UL (ref 3.8–5.1)
SODIUM: 131 MMOL/L (ref 135–146)
WHITE BLOOD CELL COUNT: 18.6 THOUSAND/UL (ref 3.8–10.8)

## 2021-09-07 ENCOUNTER — TELEPHONE (OUTPATIENT)
Dept: FAMILY MEDICINE CLINIC | Facility: CLINIC | Age: 86
End: 2021-09-07

## 2021-09-07 NOTE — TELEPHONE ENCOUNTER
Pt calling and states she needs to have a HFU appt post ERCP. Pt wants to make sure we received all of her hospital notes and schedule appt with PCP. Nothing open - pt aware PCP is out and OTW until tomorrow.

## 2021-09-08 NOTE — TELEPHONE ENCOUNTER
Spoke to pt appt made    Future Appointments   Date Time Provider Celio Bishop   9/14/2021  2:00 PM Claudell Public, MD [facility] [place] [facility] [place]

## 2021-09-14 ENCOUNTER — LAB ENCOUNTER (OUTPATIENT)
Dept: LAB | Age: 86
End: 2021-09-14
Attending: FAMILY MEDICINE
Payer: MEDICARE

## 2021-09-14 ENCOUNTER — OFFICE VISIT (OUTPATIENT)
Dept: FAMILY MEDICINE CLINIC | Facility: CLINIC | Age: 86
End: 2021-09-14
Payer: COMMERCIAL

## 2021-09-14 VITALS
HEIGHT: 62 IN | WEIGHT: 120 LBS | SYSTOLIC BLOOD PRESSURE: 112 MMHG | DIASTOLIC BLOOD PRESSURE: 70 MMHG | TEMPERATURE: 98 F | RESPIRATION RATE: 16 BRPM | OXYGEN SATURATION: 95 % | BODY MASS INDEX: 22.08 KG/M2 | HEART RATE: 85 BPM

## 2021-09-14 DIAGNOSIS — R74.8 ELEVATED LIVER ENZYMES: ICD-10-CM

## 2021-09-14 DIAGNOSIS — K83.09 CHOLANGITIS: Primary | ICD-10-CM

## 2021-09-14 DIAGNOSIS — D64.9 ANEMIA, UNSPECIFIED TYPE: ICD-10-CM

## 2021-09-14 DIAGNOSIS — K59.00 CONSTIPATION, UNSPECIFIED CONSTIPATION TYPE: ICD-10-CM

## 2021-09-14 LAB
ALBUMIN SERPL-MCNC: 3.2 G/DL (ref 3.4–5)
ALBUMIN/GLOB SERPL: 0.8 {RATIO} (ref 1–2)
ALP LIVER SERPL-CCNC: 132 U/L
ALT SERPL-CCNC: 43 U/L
ANION GAP SERPL CALC-SCNC: 4 MMOL/L (ref 0–18)
AST SERPL-CCNC: 18 U/L (ref 15–37)
BASOPHILS # BLD AUTO: 0.05 X10(3) UL (ref 0–0.2)
BASOPHILS NFR BLD AUTO: 0.6 %
BILIRUB SERPL-MCNC: 0.6 MG/DL (ref 0.1–2)
BUN BLD-MCNC: 24 MG/DL (ref 7–18)
CALCIUM BLD-MCNC: 10.2 MG/DL (ref 8.5–10.1)
CHLORIDE SERPL-SCNC: 103 MMOL/L (ref 98–112)
CO2 SERPL-SCNC: 27 MMOL/L (ref 21–32)
CREAT BLD-MCNC: 0.7 MG/DL
EOSINOPHIL # BLD AUTO: 0.09 X10(3) UL (ref 0–0.7)
EOSINOPHIL NFR BLD AUTO: 1 %
ERYTHROCYTE [DISTWIDTH] IN BLOOD BY AUTOMATED COUNT: 13.2 %
GLOBULIN PLAS-MCNC: 4 G/DL (ref 2.8–4.4)
GLUCOSE BLD-MCNC: 106 MG/DL (ref 70–99)
HCT VFR BLD AUTO: 38.8 %
HGB BLD-MCNC: 12.3 G/DL
IMM GRANULOCYTES # BLD AUTO: 0.06 X10(3) UL (ref 0–1)
IMM GRANULOCYTES NFR BLD: 0.7 %
LYMPHOCYTES # BLD AUTO: 1.44 X10(3) UL (ref 1–4)
LYMPHOCYTES NFR BLD AUTO: 16.7 %
MCH RBC QN AUTO: 30.9 PG (ref 26–34)
MCHC RBC AUTO-ENTMCNC: 31.7 G/DL (ref 31–37)
MCV RBC AUTO: 97.5 FL
MONOCYTES # BLD AUTO: 0.86 X10(3) UL (ref 0.1–1)
MONOCYTES NFR BLD AUTO: 10 %
NEUTROPHILS # BLD AUTO: 6.11 X10 (3) UL (ref 1.5–7.7)
NEUTROPHILS # BLD AUTO: 6.11 X10(3) UL (ref 1.5–7.7)
NEUTROPHILS NFR BLD AUTO: 71 %
OSMOLALITY SERPL CALC.SUM OF ELEC: 282 MOSM/KG (ref 275–295)
PATIENT FASTING Y/N/NP: NO
PLATELET # BLD AUTO: 518 10(3)UL (ref 150–450)
POTASSIUM SERPL-SCNC: 4.3 MMOL/L (ref 3.5–5.1)
PROT SERPL-MCNC: 7.2 G/DL (ref 6.4–8.2)
RBC # BLD AUTO: 3.98 X10(6)UL
SODIUM SERPL-SCNC: 134 MMOL/L (ref 136–145)
WBC # BLD AUTO: 8.6 X10(3) UL (ref 4–11)

## 2021-09-14 PROCEDURE — 3008F BODY MASS INDEX DOCD: CPT | Performed by: FAMILY MEDICINE

## 2021-09-14 PROCEDURE — 36415 COLL VENOUS BLD VENIPUNCTURE: CPT | Performed by: FAMILY MEDICINE

## 2021-09-14 PROCEDURE — 85025 COMPLETE CBC W/AUTO DIFF WBC: CPT | Performed by: FAMILY MEDICINE

## 2021-09-14 PROCEDURE — 99214 OFFICE O/P EST MOD 30 MIN: CPT | Performed by: FAMILY MEDICINE

## 2021-09-14 PROCEDURE — 1111F DSCHRG MED/CURRENT MED MERGE: CPT | Performed by: FAMILY MEDICINE

## 2021-09-14 PROCEDURE — 3074F SYST BP LT 130 MM HG: CPT | Performed by: FAMILY MEDICINE

## 2021-09-14 PROCEDURE — 3078F DIAST BP <80 MM HG: CPT | Performed by: FAMILY MEDICINE

## 2021-09-14 PROCEDURE — 80053 COMPREHEN METABOLIC PANEL: CPT | Performed by: FAMILY MEDICINE

## 2021-09-14 NOTE — PROGRESS NOTES
[facility] [place]  PROGRESS NOTE  Chief Complaint:   Patient presents with:  Hospital F/U: Patient went to [facility]       HPI:   This is a 82year old female presents to clinic with son for follow-up on recent hospitalization at [facility] problems     Allergies:  No Known Allergies  Current Meds:  Current Outpatient Medications   Medication Sig Dispense Refill   • Omega-3 Fatty Acids (OMEGA-3 FISH OIL) 1200 MG Oral Cap Take by mouth.      • Multiple Vitamin (MULTIVITAMIN ADULT OR) Take 1 cap conjunctiva normal, PERRLA, EOMI. LUNGS: Clear to auscultation bilterally, no rales/rhonchi/wheezing. HEART:  Regular rate and rhythm, S1 and S2 are normal, no murmurs, rubs or gallops.   EXTREMITIES: No edema, no cyanosis, no clubbing, FROM, 2+ dorsalis reflux     Familial multiple lipoprotein-type hyperlipidemia     Disorder of bone and cartilage     Vertigo     S/P cholecystectomy     H/O bilateral oophorectomy     Sinus congestion     Cardiac arrhythmia     Family history of ischemic heart disease

## 2021-09-14 NOTE — PATIENT INSTRUCTIONS
Continue current medications  Follow up with Dr Kristin Velasquez. Check labs today. Continue with plenty of fluids with gatorade as needed     May use miralax or colace for constipation. Return to clinic if any concern.

## 2021-09-15 ENCOUNTER — TELEPHONE (OUTPATIENT)
Dept: FAMILY MEDICINE CLINIC | Facility: CLINIC | Age: 86
End: 2021-09-15

## 2021-09-15 NOTE — TELEPHONE ENCOUNTER
----- Message from Maria G Lowe MD sent at 9/15/2021  9:49 AM CDT -----  Please inform patient that her liver enzymes are back to normal, rest of CMP is okay. Her hemoglobin level has also improved and is now in normal range.   Platelet counts are slightly

## 2021-10-01 ENCOUNTER — TELEPHONE (OUTPATIENT)
Dept: FAMILY MEDICINE CLINIC | Facility: CLINIC | Age: 86
End: 2021-10-01

## 2021-10-01 NOTE — TELEPHONE ENCOUNTER
Spoke to pt had ERCP done end of August and will have a procedure done Oct 13th. Ok to get flu shot now or wait.     Please advise

## 2021-11-23 ENCOUNTER — PATIENT OUTREACH (OUTPATIENT)
Dept: FAMILY MEDICINE CLINIC | Facility: CLINIC | Age: 86
End: 2021-11-23

## 2022-02-04 ENCOUNTER — PATIENT OUTREACH (OUTPATIENT)
Dept: FAMILY MEDICINE CLINIC | Facility: CLINIC | Age: 87
End: 2022-02-04

## 2022-03-17 ENCOUNTER — PATIENT OUTREACH (OUTPATIENT)
Dept: FAMILY MEDICINE CLINIC | Facility: CLINIC | Age: 87
End: 2022-03-17

## 2022-03-17 NOTE — PROGRESS NOTES
I spoke to patient and asked them to schedule Medicare wellness exam - they said they will call back to schedule at a later date.

## 2022-04-29 ENCOUNTER — OFFICE VISIT (OUTPATIENT)
Dept: FAMILY MEDICINE CLINIC | Facility: CLINIC | Age: 87
End: 2022-04-29
Payer: COMMERCIAL

## 2022-04-29 VITALS
OXYGEN SATURATION: 94 % | SYSTOLIC BLOOD PRESSURE: 136 MMHG | WEIGHT: 126.63 LBS | HEIGHT: 61.5 IN | HEART RATE: 76 BPM | TEMPERATURE: 99 F | BODY MASS INDEX: 23.6 KG/M2 | DIASTOLIC BLOOD PRESSURE: 80 MMHG | RESPIRATION RATE: 18 BRPM

## 2022-04-29 DIAGNOSIS — Z00.00 ENCOUNTER FOR MEDICARE ANNUAL WELLNESS EXAM: Primary | ICD-10-CM

## 2022-04-29 DIAGNOSIS — R73.9 HYPERGLYCEMIA: ICD-10-CM

## 2022-04-29 DIAGNOSIS — Z00.00 ENCOUNTER FOR ANNUAL HEALTH EXAMINATION: ICD-10-CM

## 2022-04-29 DIAGNOSIS — Z13.6 SCREENING FOR CARDIOVASCULAR CONDITION: ICD-10-CM

## 2022-04-29 DIAGNOSIS — Z13.1 SCREENING FOR DIABETES MELLITUS (DM): ICD-10-CM

## 2022-04-29 DIAGNOSIS — E78.49 FAMILIAL MULTIPLE LIPOPROTEIN-TYPE HYPERLIPIDEMIA: ICD-10-CM

## 2022-04-29 DIAGNOSIS — B02.9 HERPES ZOSTER WITHOUT COMPLICATION: ICD-10-CM

## 2022-04-29 DIAGNOSIS — J30.2 SEASONAL ALLERGIES: ICD-10-CM

## 2022-04-29 DIAGNOSIS — R42 VERTIGO: ICD-10-CM

## 2022-04-29 DIAGNOSIS — K21.9 GASTROESOPHAGEAL REFLUX DISEASE WITHOUT ESOPHAGITIS: ICD-10-CM

## 2022-04-29 PROBLEM — Z90.49 S/P CHOLECYSTECTOMY: Status: RESOLVED | Noted: 2019-11-21 | Resolved: 2022-04-29

## 2022-04-29 PROBLEM — I49.9 CARDIAC ARRHYTHMIA: Status: RESOLVED | Noted: 2019-11-06 | Resolved: 2022-04-29

## 2022-04-29 PROCEDURE — 3008F BODY MASS INDEX DOCD: CPT | Performed by: FAMILY MEDICINE

## 2022-04-29 PROCEDURE — 99397 PER PM REEVAL EST PAT 65+ YR: CPT | Performed by: FAMILY MEDICINE

## 2022-04-29 PROCEDURE — 3079F DIAST BP 80-89 MM HG: CPT | Performed by: FAMILY MEDICINE

## 2022-04-29 PROCEDURE — 3075F SYST BP GE 130 - 139MM HG: CPT | Performed by: FAMILY MEDICINE

## 2022-04-29 PROCEDURE — 96160 PT-FOCUSED HLTH RISK ASSMT: CPT | Performed by: FAMILY MEDICINE

## 2022-04-29 PROCEDURE — G0439 PPPS, SUBSEQ VISIT: HCPCS | Performed by: FAMILY MEDICINE

## 2022-04-29 RX ORDER — CHOLECALCIFEROL (VITAMIN D3) 125 MCG
1 CAPSULE ORAL DAILY
COMMUNITY

## 2022-04-29 RX ORDER — ATORVASTATIN CALCIUM 10 MG/1
10 TABLET, FILM COATED ORAL NIGHTLY
Qty: 90 TABLET | Refills: 1 | Status: SHIPPED | OUTPATIENT
Start: 2022-04-29

## 2022-04-29 RX ORDER — MECLIZINE HCL 12.5 MG/1
12.5 TABLET ORAL DAILY PRN
Qty: 30 TABLET | Refills: 2 | Status: SHIPPED | OUTPATIENT
Start: 2022-04-29

## 2022-04-29 RX ORDER — MONTELUKAST SODIUM 10 MG/1
10 TABLET ORAL NIGHTLY
Qty: 30 TABLET | Refills: 2 | Status: SHIPPED | OUTPATIENT
Start: 2022-04-29

## 2022-05-19 ENCOUNTER — TELEPHONE (OUTPATIENT)
Dept: FAMILY MEDICINE CLINIC | Facility: CLINIC | Age: 87
End: 2022-05-19

## 2022-05-19 DIAGNOSIS — R53.83 OTHER FATIGUE: Primary | ICD-10-CM

## 2022-05-19 NOTE — TELEPHONE ENCOUNTER
Spoke to pt stated that she wanted to see if she could have Vitamin D and Iron level added to her labs. Pt stated she has been fatigued lately and would like these checked. Pt stated her shingles rash is getting better and will come in for labs soon. Please advise.

## 2022-05-19 NOTE — TELEPHONE ENCOUNTER
Pt calling seeking additional lab orders for upcoming draw. She would like to speak to nurse. Please advise.

## 2022-05-19 NOTE — TELEPHONE ENCOUNTER
Please inform patient that I have added iron panel in her labs. I recommend against checking vitamin D. Insurance will not cover it and she may end up with $400-$800 bill. I recommend that she takes over-the-counter vitamin D 1000 units daily. If she still wants it I could add to her labs.

## 2022-06-20 ENCOUNTER — TELEPHONE (OUTPATIENT)
Dept: FAMILY MEDICINE CLINIC | Facility: CLINIC | Age: 87
End: 2022-06-20

## 2022-06-20 NOTE — TELEPHONE ENCOUNTER
Lab orders were to be sent to [facility] but patient called to make appt and they do not have orders. Fax [phone]. Requesting call once orders are sent.

## 2022-06-20 NOTE — TELEPHONE ENCOUNTER
Spoke to pt, [facility] listed as preferred lab, pt wants to go to [place] instead. Advised would fax labs over.

## 2022-06-30 ENCOUNTER — TELEPHONE (OUTPATIENT)
Dept: FAMILY MEDICINE CLINIC | Facility: CLINIC | Age: 87
End: 2022-06-30

## 2022-06-30 DIAGNOSIS — K21.9 GASTROESOPHAGEAL REFLUX DISEASE WITHOUT ESOPHAGITIS: Primary | ICD-10-CM

## 2022-06-30 DIAGNOSIS — Z00.00 ENCOUNTER FOR MEDICARE ANNUAL WELLNESS EXAM: ICD-10-CM

## 2022-06-30 NOTE — TELEPHONE ENCOUNTER
Spoke to pt stated that she still would like to have her Vitamin D level checked. Spoke to pt 5/19/22 regarding this lab. Pt stated that she checked with her insurance and it is covered. Pt has not completed the labs sent last time yet would like to add this lab.     Pt goes to [facility] for her labs,  Please advise

## 2022-07-14 ENCOUNTER — TELEPHONE (OUTPATIENT)
Dept: FAMILY MEDICINE CLINIC | Facility: CLINIC | Age: 87
End: 2022-07-14

## 2022-07-14 NOTE — TELEPHONE ENCOUNTER
Spoke to Fredy at [facility] pt vitamin D lab is still pending, will be back early next week.

## 2022-07-14 NOTE — TELEPHONE ENCOUNTER
Spoke to pt informed vitamin D is still pending. Pt asked to have all labs when completed mailed to her. No further questions.

## 2022-07-14 NOTE — TELEPHONE ENCOUNTER
Spoke to pt verbalized understanding of lab results and recommendations. Pt inquired about her Vitamin D level, per Care everywhere the Vitamin D was not listed. Will check with [id number] [facility] lab for those results.

## 2022-07-14 NOTE — TELEPHONE ENCOUNTER
Please inform patient that her labs done at hospital shows normal CBC, hemoglobin A1c, lipid panel, iron panel. Her CMP is okay except her calcium is slightly elevated at 10.6. Recommend to cut back on calcium supplement. Return to clinic if any concerns or any questions.

## 2022-07-22 ENCOUNTER — TELEPHONE (OUTPATIENT)
Dept: FAMILY MEDICINE CLINIC | Facility: CLINIC | Age: 87
End: 2022-07-22

## 2022-07-22 LAB
VITAMIN D, 1,25 (OH)2,$TOTAL: 71
VITAMIN D2, 1,25 (OH)2: <8
VITAMIN D3, 1,25 (OH)2: 71

## 2022-07-22 NOTE — TELEPHONE ENCOUNTER
Patient states she has not received a call regarding Vit D level and mammogram. Both lab and mammogram are in care everywhere-[facility].  Please advise on Vit D level and mammogram. Thank you

## 2022-07-25 ENCOUNTER — TELEPHONE (OUTPATIENT)
Dept: FAMILY MEDICINE CLINIC | Facility: CLINIC | Age: 87
End: 2022-07-25

## 2022-07-25 NOTE — TELEPHONE ENCOUNTER
Mammogram was normal, no abnormalities. Next mammogram in a year. Vitamin D levels are within normal limits. Continue current POC.

## 2022-08-01 ENCOUNTER — TELEPHONE (OUTPATIENT)
Dept: FAMILY MEDICINE CLINIC | Facility: CLINIC | Age: 87
End: 2022-08-01

## 2022-09-20 ENCOUNTER — TELEPHONE (OUTPATIENT)
Dept: FAMILY MEDICINE CLINIC | Facility: CLINIC | Age: 87
End: 2022-09-20

## 2022-09-20 NOTE — TELEPHONE ENCOUNTER
Spoke to pt has 4 doses of Selestino Left and has heard about a new vaccine from [facility] and wanting to know if she and her  should get it. Also need to verify time between the flu and covid vaccine.      Please advise

## 2022-09-20 NOTE — TELEPHONE ENCOUNTER
Yes it is recommended to have booster done. Patient may have both COVID booster and flu shot at same time.

## 2022-10-07 ENCOUNTER — OFFICE VISIT (OUTPATIENT)
Dept: FAMILY MEDICINE CLINIC | Facility: CLINIC | Age: 87
End: 2022-10-07
Payer: COMMERCIAL

## 2022-10-07 VITALS
BODY MASS INDEX: 24.38 KG/M2 | SYSTOLIC BLOOD PRESSURE: 122 MMHG | HEIGHT: 61.5 IN | WEIGHT: 130.81 LBS | RESPIRATION RATE: 16 BRPM | DIASTOLIC BLOOD PRESSURE: 70 MMHG | TEMPERATURE: 97 F | OXYGEN SATURATION: 97 % | HEART RATE: 79 BPM

## 2022-10-07 DIAGNOSIS — M54.41 ACUTE RIGHT-SIDED LOW BACK PAIN WITH RIGHT-SIDED SCIATICA: Primary | ICD-10-CM

## 2022-10-07 PROCEDURE — 3074F SYST BP LT 130 MM HG: CPT | Performed by: FAMILY MEDICINE

## 2022-10-07 PROCEDURE — 3078F DIAST BP <80 MM HG: CPT | Performed by: FAMILY MEDICINE

## 2022-10-07 PROCEDURE — 99213 OFFICE O/P EST LOW 20 MIN: CPT | Performed by: FAMILY MEDICINE

## 2022-10-07 PROCEDURE — 3008F BODY MASS INDEX DOCD: CPT | Performed by: FAMILY MEDICINE

## 2022-10-07 NOTE — PATIENT INSTRUCTIONS
Rest, heating pad, tylenol or aleve as needed   Start physical therapy. Return to clinic if no improvement.

## 2022-10-31 DIAGNOSIS — E78.49 FAMILIAL MULTIPLE LIPOPROTEIN-TYPE HYPERLIPIDEMIA: ICD-10-CM

## 2022-10-31 NOTE — TELEPHONE ENCOUNTER
Atorvastatin: 4/29/22       Return in 1 year (on 4/29/2023) for medicare physical.  Future appt:    Last Appointment with provider:   10/7/2022  Last appointment at [facility]:  10/7/2022  No results found for: CHOLEST, HDL, LDL, TRIGLY, TRIG  No results found for: EAG, A1C  No results found for: T4F, TSH, TSHT4    No follow-ups on file.

## 2022-11-01 RX ORDER — ATORVASTATIN CALCIUM 10 MG/1
TABLET, FILM COATED ORAL
Qty: 90 TABLET | Refills: 1 | Status: SHIPPED | OUTPATIENT
Start: 2022-11-01

## 2023-04-04 ENCOUNTER — PATIENT OUTREACH (OUTPATIENT)
Dept: FAMILY MEDICINE CLINIC | Facility: CLINIC | Age: 88
End: 2023-04-04

## 2023-05-02 ENCOUNTER — PATIENT OUTREACH (OUTPATIENT)
Dept: FAMILY MEDICINE CLINIC | Facility: CLINIC | Age: 88
End: 2023-05-02

## 2023-05-07 DIAGNOSIS — E78.49 FAMILIAL MULTIPLE LIPOPROTEIN-TYPE HYPERLIPIDEMIA: ICD-10-CM

## 2023-05-08 RX ORDER — ATORVASTATIN CALCIUM 10 MG/1
TABLET, FILM COATED ORAL
Qty: 90 TABLET | Refills: 0 | Status: SHIPPED | OUTPATIENT
Start: 2023-05-08

## 2023-05-08 NOTE — TELEPHONE ENCOUNTER
Please inform patient that her prescription has been sent  Remind patient that she is due for her annual exam, recommend to schedule Medicare wellness exam as soon as possible.

## 2023-05-08 NOTE — TELEPHONE ENCOUNTER
Future appt:    Last Appointment with provider:  10/7/2022; No f/u recommended    Last appointment at [facility]:  Visit date not found  No results found for: CHOLEST, HDL, LDL, TRIGLY, TRIG  No results found for: EAG, A1C  No results found for: T4F, TSH, TSHT4    Last RF:  11/1/2022    No follow-ups on file.

## 2023-05-09 NOTE — TELEPHONE ENCOUNTER
Spoke to pt appt made    Future Appointments   Date Time Provider Celio Bishop   5/31/2023 11:00 AM Terry Abdullahi MD [facility]

## 2023-05-31 ENCOUNTER — OFFICE VISIT (OUTPATIENT)
Dept: FAMILY MEDICINE CLINIC | Facility: CLINIC | Age: 88
End: 2023-05-31
Payer: MEDICARE

## 2023-05-31 VITALS
TEMPERATURE: 98 F | HEART RATE: 79 BPM | DIASTOLIC BLOOD PRESSURE: 82 MMHG | RESPIRATION RATE: 18 BRPM | WEIGHT: 132 LBS | OXYGEN SATURATION: 95 % | BODY MASS INDEX: 24.92 KG/M2 | SYSTOLIC BLOOD PRESSURE: 138 MMHG | HEIGHT: 61 IN

## 2023-05-31 DIAGNOSIS — J30.2 SEASONAL ALLERGIES: ICD-10-CM

## 2023-05-31 DIAGNOSIS — Z00.00 ENCOUNTER FOR MEDICARE ANNUAL WELLNESS EXAM: Primary | ICD-10-CM

## 2023-05-31 DIAGNOSIS — K21.9 GASTROESOPHAGEAL REFLUX DISEASE WITHOUT ESOPHAGITIS: ICD-10-CM

## 2023-05-31 DIAGNOSIS — E78.49 FAMILIAL MULTIPLE LIPOPROTEIN-TYPE HYPERLIPIDEMIA: ICD-10-CM

## 2023-05-31 DIAGNOSIS — R73.9 HYPERGLYCEMIA: ICD-10-CM

## 2023-05-31 DIAGNOSIS — R42 VERTIGO: ICD-10-CM

## 2023-05-31 DIAGNOSIS — Z00.00 ENCOUNTER FOR ANNUAL HEALTH EXAMINATION: ICD-10-CM

## 2023-05-31 PROCEDURE — 1126F AMNT PAIN NOTED NONE PRSNT: CPT | Performed by: FAMILY MEDICINE

## 2023-05-31 PROCEDURE — 1160F RVW MEDS BY RX/DR IN RCRD: CPT | Performed by: FAMILY MEDICINE

## 2023-05-31 PROCEDURE — 3079F DIAST BP 80-89 MM HG: CPT | Performed by: FAMILY MEDICINE

## 2023-05-31 PROCEDURE — 1159F MED LIST DOCD IN RCRD: CPT | Performed by: FAMILY MEDICINE

## 2023-05-31 PROCEDURE — 96160 PT-FOCUSED HLTH RISK ASSMT: CPT | Performed by: FAMILY MEDICINE

## 2023-05-31 PROCEDURE — 3008F BODY MASS INDEX DOCD: CPT | Performed by: FAMILY MEDICINE

## 2023-05-31 PROCEDURE — 3075F SYST BP GE 130 - 139MM HG: CPT | Performed by: FAMILY MEDICINE

## 2023-05-31 PROCEDURE — 1170F FXNL STATUS ASSESSED: CPT | Performed by: FAMILY MEDICINE

## 2023-05-31 PROCEDURE — G0439 PPPS, SUBSEQ VISIT: HCPCS | Performed by: FAMILY MEDICINE

## 2023-05-31 RX ORDER — ATORVASTATIN CALCIUM 10 MG/1
10 TABLET, FILM COATED ORAL NIGHTLY
Qty: 90 TABLET | Refills: 3 | Status: SHIPPED | OUTPATIENT
Start: 2023-05-31

## 2023-06-22 ENCOUNTER — TELEPHONE (OUTPATIENT)
Dept: FAMILY MEDICINE CLINIC | Facility: CLINIC | Age: 88
End: 2023-06-22

## 2023-06-22 NOTE — TELEPHONE ENCOUNTER
Patient's blood work done on 6/20/2023 reviewed by myself as Dr. Rukhsana Franco is out of the office. Patient's chloride and calcium elevated patient's BUN elevated. Patient is showing signs of dehydration I encouraged the patient to increase water intake. Triglycerides slightly elevated and other nonspecific findings. I recommend a healthy diet and exercise for the patient. Recommended diet high in fruits and vegetables, and lean meats.

## 2023-07-21 ENCOUNTER — TELEPHONE (OUTPATIENT)
Dept: FAMILY MEDICINE CLINIC | Facility: CLINIC | Age: 88
End: 2023-07-21

## 2023-07-21 DIAGNOSIS — Z12.31 ENCOUNTER FOR SCREENING MAMMOGRAM FOR MALIGNANT NEOPLASM OF BREAST: Primary | ICD-10-CM

## 2023-07-21 NOTE — TELEPHONE ENCOUNTER
Mammogram screening results reviewed. Normal mammogram noted. Benign calcifications and vascular calcifications are present in the breast.  Continue to monitor repeat mammogram in 1 year. No

## 2023-08-31 NOTE — PATIENT INSTRUCTIONS
Continue with same medications. Stay active. Begin atorvastatin 10 mg daily. Recheck fasting lab work in 2 months.     [facility]'s SCREENING SCHEDULE   Tests on this list are recommended by your physician but may not be covered, or covered at this fr What Is The Reason For Today's Visit?: Full Body Skin Examination preventive care reminders to display for this patient. Update Health Maintenance if applicable    Flex Sigmoidoscopy Screen  Covered every 5 years No results found for this or any previous visit. No flowsheet data found.      Fecal Occult Blood   Covered An (Pneumovax)  Covered Once after 65 No orders found for this or any previous visit. Please get once after your 65th birthday    Hepatitis B for Moderate/High Risk       No orders found for this or any previous visit.  Medium/high risk factors:   End-stage re

## (undated) NOTE — LETTER
JANINE. Notifier: [email or web address]   SUE. Patient Name: Jh Maurice. Identification Number: [id number]      Advance Beneficiary Notice of Noncoverage (ABN)  NOTE:  If Medicare doesn’t pay for D.below, you may have to pay.   Medicare does not pay for ever ? OPTION 3. I don’t want the D.listed above. I understand with this choice  I am not responsible for payment, and I cannot appeal to see if Medicare would pay. H. Additional Information:     This notice gives our opinion, not an official Medicare decisi

## (undated) NOTE — LETTER
6/11/2021  [facility]  [address]    We take each of our patient's health very seriously and the key to maintaining your health is an annual wellness physical.  Review of your medical records shows that it is time for your kiera

## (undated) NOTE — LETTER
06/15/18        [facility]  [address]      Dear [facility] Cockayne June,    Our records indicate that you have outstanding lab work and or testing that was ordered for you and has not yet been completed:          CBC W Differential W Platel

## (undated) NOTE — MR AVS SNAPSHOT
[address]  [id number]  [phone]               Thank you for choosing us for your health care visit with Momo Branch MD.  We are glad to serve you and happy to provide you with this summary of • History of gestational diabetes or birth of baby weighing more than 9 pounds     Covered at least every 3 years,         No results found for: GLUCOSE, GLU Medicare covers annually or at 6-month intervals for prediabetic patients        Cardiovascular [facility] [employer]ial benefit unless patient has history of long-term glucocorticoid use for medical condition    No results found for this or any previous visit. No flowsheet data found.     Recommended for 2 year anniversary or as ordered in After Visit Summary   Pap Recommended Websites for Advanced Directives    [email or web address] org/publications/Documents/personal_[email or web address]. pdf  An information packet, including necessary form from the [employer] website. http://[email or web address] [place].state. [place]/public/books/advin Sign up for [employer], your secure online medical record. [employer] will allow you to access patient instructions from your recent visit,  view other health information, and more. To sign up or find more information, go to [email or web address]. org and cl